# Patient Record
Sex: MALE | Race: WHITE | NOT HISPANIC OR LATINO | URBAN - METROPOLITAN AREA
[De-identification: names, ages, dates, MRNs, and addresses within clinical notes are randomized per-mention and may not be internally consistent; named-entity substitution may affect disease eponyms.]

---

## 2017-05-17 ENCOUNTER — EMERGENCY (EMERGENCY)
Facility: HOSPITAL | Age: 52
LOS: 0 days | Discharge: HOME | End: 2017-05-18
Attending: EMERGENCY MEDICINE | Admitting: EMERGENCY MEDICINE
Payer: COMMERCIAL

## 2017-05-17 VITALS
OXYGEN SATURATION: 93 % | TEMPERATURE: 98 F | DIASTOLIC BLOOD PRESSURE: 78 MMHG | HEART RATE: 102 BPM | WEIGHT: 265 LBS | HEIGHT: 72 IN | SYSTOLIC BLOOD PRESSURE: 130 MMHG | RESPIRATION RATE: 18 BRPM
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
## 2017-05-17 DIAGNOSIS — S29.9XXA UNSPECIFIED INJURY OF THORAX, INITIAL ENCOUNTER: ICD-10-CM

## 2017-05-17 DIAGNOSIS — S22.32XA FRACTURE OF ONE RIB, LEFT SIDE, INITIAL ENCOUNTER FOR CLOSED FRACTURE: ICD-10-CM
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
## 2017-05-17 LAB
ALBUMIN SERPL ELPH-MCNC: 4 G/DL — SIGNIFICANT CHANGE UP (ref 3.3–5)
ALP SERPL-CCNC: 76 U/L — SIGNIFICANT CHANGE UP (ref 40–120)
ALT FLD-CCNC: 48 U/L — SIGNIFICANT CHANGE UP (ref 12–78)
ANION GAP SERPL CALC-SCNC: 11 MMOL/L — SIGNIFICANT CHANGE UP (ref 5–17)
APTT BLD: 27.6 SEC — SIGNIFICANT CHANGE UP (ref 27.5–37.4)
AST SERPL-CCNC: 28 U/L — SIGNIFICANT CHANGE UP (ref 15–37)
BILIRUB SERPL-MCNC: 0.3 MG/DL — SIGNIFICANT CHANGE UP (ref 0.2–1.2)
BUN SERPL-MCNC: 16 MG/DL — SIGNIFICANT CHANGE UP (ref 7–23)
CALCIUM SERPL-MCNC: 9.2 MG/DL — SIGNIFICANT CHANGE UP (ref 8.5–10.1)
CHLORIDE SERPL-SCNC: 108 MMOL/L — SIGNIFICANT CHANGE UP (ref 96–108)
CO2 SERPL-SCNC: 24 MMOL/L — SIGNIFICANT CHANGE UP (ref 22–31)
CREAT SERPL-MCNC: 1.23 MG/DL — SIGNIFICANT CHANGE UP (ref 0.5–1.3)
GLUCOSE SERPL-MCNC: 138 MG/DL — HIGH (ref 70–99)
HCT VFR BLD CALC: 45.6 % — SIGNIFICANT CHANGE UP (ref 39–50)
HGB BLD-MCNC: 15.5 G/DL — SIGNIFICANT CHANGE UP (ref 13–17)
INR BLD: 1.01 RATIO — SIGNIFICANT CHANGE UP (ref 0.88–1.16)
MCHC RBC-ENTMCNC: 32 PG — SIGNIFICANT CHANGE UP (ref 27–34)
MCHC RBC-ENTMCNC: 34 GM/DL — SIGNIFICANT CHANGE UP (ref 32–36)
MCV RBC AUTO: 94.4 FL — SIGNIFICANT CHANGE UP (ref 80–100)
PLATELET # BLD AUTO: 169 K/UL — SIGNIFICANT CHANGE UP (ref 150–400)
POTASSIUM SERPL-MCNC: 4 MMOL/L — SIGNIFICANT CHANGE UP (ref 3.5–5.3)
POTASSIUM SERPL-SCNC: 4 MMOL/L — SIGNIFICANT CHANGE UP (ref 3.5–5.3)
PROT SERPL-MCNC: 7.6 GM/DL — SIGNIFICANT CHANGE UP (ref 6–8.3)
PROTHROM AB SERPL-ACNC: 10.9 SEC — SIGNIFICANT CHANGE UP (ref 9.8–12.7)
RBC # BLD: 4.84 M/UL — SIGNIFICANT CHANGE UP (ref 4.2–5.8)
RBC # FLD: 11.8 % — SIGNIFICANT CHANGE UP (ref 10.3–14.5)
SODIUM SERPL-SCNC: 143 MMOL/L — SIGNIFICANT CHANGE UP (ref 135–145)
TYPE + AB SCN PNL BLD: SIGNIFICANT CHANGE UP
WBC # BLD: 8.5 K/UL — SIGNIFICANT CHANGE UP (ref 3.8–10.5)
WBC # FLD AUTO: 8.5 K/UL — SIGNIFICANT CHANGE UP (ref 3.8–10.5)

## 2017-05-17 PROCEDURE — 93010 ELECTROCARDIOGRAM REPORT: CPT

## 2017-05-17 PROCEDURE — 99285 EMERGENCY DEPT VISIT HI MDM: CPT

## 2017-05-17 PROCEDURE — 71260 CT THORAX DX C+: CPT | Mod: 26

## 2017-05-17 PROCEDURE — 74177 CT ABD & PELVIS W/CONTRAST: CPT | Mod: 26

## 2017-05-17 RX ORDER — FENTANYL CITRATE 50 UG/ML
100 INJECTION INTRAVENOUS ONCE
Qty: 0 | Refills: 0 | Status: DISCONTINUED | OUTPATIENT
Start: 2017-05-17 | End: 2017-05-17

## 2017-05-17 RX ORDER — SODIUM CHLORIDE 9 MG/ML
1000 INJECTION INTRAMUSCULAR; INTRAVENOUS; SUBCUTANEOUS ONCE
Qty: 0 | Refills: 0 | Status: COMPLETED | OUTPATIENT
Start: 2017-05-17 | End: 2017-05-17

## 2017-05-17 RX ORDER — MORPHINE SULFATE 50 MG/1
10 CAPSULE, EXTENDED RELEASE ORAL ONCE
Qty: 0 | Refills: 0 | Status: DISCONTINUED | OUTPATIENT
Start: 2017-05-17 | End: 2017-05-17

## 2017-05-17 RX ADMIN — MORPHINE SULFATE 10 MILLIGRAM(S): 50 CAPSULE, EXTENDED RELEASE ORAL at 22:46

## 2017-05-17 RX ADMIN — SODIUM CHLORIDE 3000 MILLILITER(S): 9 INJECTION INTRAMUSCULAR; INTRAVENOUS; SUBCUTANEOUS at 20:25

## 2017-05-17 RX ADMIN — FENTANYL CITRATE 100 MICROGRAM(S): 50 INJECTION INTRAVENOUS at 20:25

## 2017-05-17 RX ADMIN — FENTANYL CITRATE 100 MICROGRAM(S): 50 INJECTION INTRAVENOUS at 20:55

## 2017-05-17 NOTE — ED ADULT NURSE REASSESSMENT NOTE - NS ED NURSE REASSESS COMMENT FT1
Morphine 10mg IV given per md order for pain. Pt tolerating well. VSS. Urinal provided for urination. Will cont to monitor for safety and comfort.

## 2017-05-17 NOTE — ED ADULT NURSE REASSESSMENT NOTE - NS ED NURSE REASSESS COMMENT FT1
pt back from CT scan. pt c/o worsening left sided body pain from moving for CT scan. VSS. Wife at bedside. request pain medication. Dr. lara made aware. Will cont to monitor for safety and comfort.

## 2017-05-17 NOTE — ED ADULT TRIAGE NOTE - CHIEF COMPLAINT QUOTE
Pt. to the ED BIBA C/O Left Side Pain S/P Fall from playing baseball- Pt. states he slid and fell by tripping- Pt. states pain was so intense that he almost passed out- denies hitting head and LOC - pt. currently diaphoretic and C/O SOB

## 2017-05-17 NOTE — ED ADULT NURSE NOTE - OBJECTIVE STATEMENT
50 y/o male awake alert and oriented x4 presents to ED c/o left sided body pain s/p falling playing baseball. Pt was running to bases, tripped and fell on Left sided of his body. Pt had 3 beers today. Denies head injury or LOC. c/o left sided chest pain worse with deep breathe. O2 Sat 92% on RA. 2L NC in place upon arrival. EKG performed and reviewed by Dr. Velazquez. Trauma alert cancelled by Dr. Velazquez at bedside. Moves all extremities well. c/o nausea. Denies sob,ha,dz,v/d/fever/chills or urinary sx.

## 2017-05-18 VITALS
OXYGEN SATURATION: 97 % | DIASTOLIC BLOOD PRESSURE: 80 MMHG | TEMPERATURE: 98 F | HEART RATE: 90 BPM | SYSTOLIC BLOOD PRESSURE: 110 MMHG | RESPIRATION RATE: 18 BRPM

## 2017-05-18 NOTE — ED ADULT NURSE REASSESSMENT NOTE - NS ED NURSE REASSESS COMMENT FT1
Pt had difficulty getting in and out of bed, but ambulated to and from the bathroom with steady and didn't desat below 95% on RA. VSS. Incentive Spirometer provided with instructions. Verbalize and demonstrate understandings. Dr. lara made aware. Will cont to monitor for safety and comfort.

## 2017-05-19 NOTE — ED PROVIDER NOTE - PHYSICAL EXAMINATION
neuro: CN II - XII intact, EOMI, PERRL, no papilledema, 5/5 muscle strength x 4 extremities, no sensory deficits, 2+ dtr globally, negative babinski, no ataxic gait, normal LIZA and FNT, normal romberg

## 2017-05-19 NOTE — ED PROVIDER NOTE - RESPIRATORY, MLM
Breath sounds clear and equal bilaterally. + ttp left chest wall rib 7 - 9 no flail chest no ecchymosis

## 2017-05-19 NOTE — ED PROVIDER NOTE - OBJECTIVE STATEMENT
patient presents with left sdied rib pain constant achy sharp s/p mechanical fall running to second base no hemoptysis no loc. no blood thinners. denies fever. denies HA or neck pain. + chest pain + sob. no abd pain. no n/v/d. no urinary f/u/d. no back pain. no motor or sensory deficits. denies drug use. no other acute issues symptoms or concerns

## 2017-05-19 NOTE — ED PROVIDER NOTE - CONSTITUTIONAL, MLM
normal... obese , awake, alert, oriented to person, place, time/situation in mild distress from pain

## 2017-05-19 NOTE — ED PROVIDER NOTE - MEDICAL DECISION MAKING DETAILS
amnbulating IS given pain control reliable patient no hypoxemia return to ed for intyractable chest pain or sob concussion talk given

## 2021-03-22 NOTE — ED ADULT NURSE NOTE - FINAL NURSING ELECTRONIC SIGNATURE
I spoke with the patient by phone. His most recent CT scan it showed a nonobstructing right-sided kidney stone. There is a history of cysts on the kidneys.   As this is not my area of medical expertise, I would defer discussion related to his kidney funct 18-May-2017 00:14